# Patient Record
Sex: FEMALE | Race: WHITE | NOT HISPANIC OR LATINO | Employment: OTHER | URBAN - METROPOLITAN AREA
[De-identification: names, ages, dates, MRNs, and addresses within clinical notes are randomized per-mention and may not be internally consistent; named-entity substitution may affect disease eponyms.]

---

## 2015-03-09 LAB — PAP SMEAR - HIM PATIENT REPORTED: NEGATIVE

## 2017-07-06 ASSESSMENT — ENCOUNTER SYMPTOMS
INSOMNIA: 0
ABDOMINAL PAIN: 0
BOWEL INCONTINENCE: 0
CHILLS: 0
CONSTIPATION: 1
NIGHT SWEATS: 1
BLOOD IN STOOL: 0
WEIGHT GAIN: 1
DECREASED LIBIDO: 0
INCREASED ENERGY: 0
DECREASED CONCENTRATION: 0
JAUNDICE: 0
FEVER: 0
POLYDIPSIA: 0
ALTERED TEMPERATURE REGULATION: 1
NERVOUS/ANXIOUS: 0
DEPRESSION: 0
POLYPHAGIA: 0
FATIGUE: 0
HALLUCINATIONS: 0
RECTAL BLEEDING: 0
PANIC: 0
DECREASED APPETITE: 0
HOT FLASHES: 0
HEARTBURN: 0
BLOATING: 1
SWOLLEN GLANDS: 1
NAUSEA: 1
DIARRHEA: 1
POOR WOUND HEALING: 0
RECTAL PAIN: 0
BRUISES/BLEEDS EASILY: 0
WEIGHT LOSS: 0
SKIN CHANGES: 0
NAIL CHANGES: 0
VOMITING: 1

## 2017-07-06 ASSESSMENT — ACTIVITIES OF DAILY LIVING (ADL)
ARE_THERE_SMOKE_DETECTORS_IN_YOUR_HOME?: Y
ARE_THERE_CARBON_MONOXIDE_DETECTORS_IN_YOUR_HOME?: Y
DO_MEMBERS_OF_YOUR_HOUSEHOLD_USE_SAFETY_HELMETS?: Y
DO_MEMBERS_OF_YOUR_HOUSEHOLD_USE_SUNSCREEN?: Y
DO_MEMBERS_OF_YOUR_HOUSEHOLD_WEAR_SEAT_BELTS?: Y
ARE_THERE_FIREARMS_IN_YOUR_HOME?: Y

## 2017-07-07 ENCOUNTER — OFFICE VISIT (OUTPATIENT)
Dept: INTERNAL MEDICINE | Facility: CLINIC | Age: 27
End: 2017-07-07

## 2017-07-07 VITALS
WEIGHT: 130 LBS | SYSTOLIC BLOOD PRESSURE: 114 MMHG | DIASTOLIC BLOOD PRESSURE: 71 MMHG | BODY MASS INDEX: 19.7 KG/M2 | HEIGHT: 68 IN | RESPIRATION RATE: 16 BRPM | HEART RATE: 84 BPM

## 2017-07-07 DIAGNOSIS — R59.1 LYMPHADENOPATHY: ICD-10-CM

## 2017-07-07 DIAGNOSIS — Z00.00 ENCOUNTER FOR ROUTINE ADULT HEALTH EXAMINATION WITHOUT ABNORMAL FINDINGS: ICD-10-CM

## 2017-07-07 DIAGNOSIS — R73.09 ELEVATED GLUCOSE: Primary | ICD-10-CM

## 2017-07-07 DIAGNOSIS — R73.09 ELEVATED GLUCOSE: ICD-10-CM

## 2017-07-07 LAB
ANION GAP SERPL CALCULATED.3IONS-SCNC: 8 MMOL/L (ref 3–14)
BASOPHILS # BLD AUTO: 0 10E9/L (ref 0–0.2)
BASOPHILS NFR BLD AUTO: 0.4 %
BUN SERPL-MCNC: 15 MG/DL (ref 7–30)
CALCIUM SERPL-MCNC: 9.1 MG/DL (ref 8.5–10.1)
CHLORIDE SERPL-SCNC: 103 MMOL/L (ref 94–109)
CO2 SERPL-SCNC: 26 MMOL/L (ref 20–32)
CREAT SERPL-MCNC: 0.8 MG/DL (ref 0.52–1.04)
DIFFERENTIAL METHOD BLD: NORMAL
EOSINOPHIL # BLD AUTO: 0 10E9/L (ref 0–0.7)
EOSINOPHIL NFR BLD AUTO: 0.4 %
ERYTHROCYTE [DISTWIDTH] IN BLOOD BY AUTOMATED COUNT: 12.7 % (ref 10–15)
GFR SERPL CREATININE-BSD FRML MDRD: 85 ML/MIN/1.7M2
GLUCOSE SERPL-MCNC: 79 MG/DL (ref 70–99)
HBA1C MFR BLD: 5.2 % (ref 4.3–6)
HCT VFR BLD AUTO: 38.1 % (ref 35–47)
HGB BLD-MCNC: 13.1 G/DL (ref 11.7–15.7)
IMM GRANULOCYTES # BLD: 0 10E9/L (ref 0–0.4)
IMM GRANULOCYTES NFR BLD: 0.1 %
LYMPHOCYTES # BLD AUTO: 1.9 10E9/L (ref 0.8–5.3)
LYMPHOCYTES NFR BLD AUTO: 28.4 %
MCH RBC QN AUTO: 31.4 PG (ref 26.5–33)
MCHC RBC AUTO-ENTMCNC: 34.4 G/DL (ref 31.5–36.5)
MCV RBC AUTO: 91 FL (ref 78–100)
MONOCYTES # BLD AUTO: 0.4 10E9/L (ref 0–1.3)
MONOCYTES NFR BLD AUTO: 5.3 %
NEUTROPHILS # BLD AUTO: 4.4 10E9/L (ref 1.6–8.3)
NEUTROPHILS NFR BLD AUTO: 65.4 %
NRBC # BLD AUTO: 0 10*3/UL
NRBC BLD AUTO-RTO: 0 /100
PLATELET # BLD AUTO: 197 10E9/L (ref 150–450)
POTASSIUM SERPL-SCNC: 3.7 MMOL/L (ref 3.4–5.3)
RBC # BLD AUTO: 4.17 10E12/L (ref 3.8–5.2)
SODIUM SERPL-SCNC: 137 MMOL/L (ref 133–144)
WBC # BLD AUTO: 6.8 10E9/L (ref 4–11)

## 2017-07-07 RX ORDER — COPPER 313.4 MG/1
1 INTRAUTERINE DEVICE INTRAUTERINE ONCE
COMMUNITY
End: 2019-07-01

## 2017-07-07 ASSESSMENT — ENCOUNTER SYMPTOMS
TACHYCARDIA: 0
BOWEL INCONTINENCE: 0
DOUBLE VISION: 0
TINGLING: 0
NECK MASS: 0
SWOLLEN GLANDS: 1
POOR WOUND HEALING: 0
NECK PAIN: 0
SNORES LOUDLY: 0
SPUTUM PRODUCTION: 0
FEVER: 0
CHILLS: 0
JAUNDICE: 0
MUSCLE CRAMPS: 0
HOARSE VOICE: 0
EXERCISE INTOLERANCE: 0
BLOOD IN STOOL: 0
WEIGHT LOSS: 0
DECREASED APPETITE: 0
DEPRESSION: 0
NAIL CHANGES: 0
DECREASED LIBIDO: 0
WEIGHT GAIN: 1
PALPITATIONS: 0
DIFFICULTY URINATING: 0
SPEECH CHANGE: 0
EYE IRRITATION: 0
BACK PAIN: 0
CONSTIPATION: 1
NERVOUS/ANXIOUS: 0
SINUS PAIN: 0
MUSCLE WEAKNESS: 0
ABDOMINAL PAIN: 0
DIARRHEA: 1
EXTREMITY NUMBNESS: 0
DYSPNEA ON EXERTION: 0
MEMORY LOSS: 0
ORTHOPNEA: 0
RESPIRATORY PAIN: 0
POLYPHAGIA: 0
SMELL DISTURBANCE: 0
WEAKNESS: 0
ALTERED TEMPERATURE REGULATION: 1
HEMATURIA: 0
RECTAL PAIN: 0
TREMORS: 0
EYE REDNESS: 0
DECREASED CONCENTRATION: 0
BLOATING: 1
DIZZINESS: 0
SORE THROAT: 0
PARALYSIS: 0
SYNCOPE: 0
WHEEZING: 0
TROUBLE SWALLOWING: 0
STIFFNESS: 0
JOINT SWELLING: 0
COUGH DISTURBING SLEEP: 0
EYE WATERING: 0
BRUISES/BLEEDS EASILY: 0
MYALGIAS: 0
HALLUCINATIONS: 0
FLANK PAIN: 0
INCREASED ENERGY: 0
VOMITING: 1
HYPOTENSION: 0
SEIZURES: 0
DYSURIA: 0
HEADACHES: 0
INSOMNIA: 0
FATIGUE: 0
NUMBNESS: 0
BREAST PAIN: 0
RECTAL BLEEDING: 0
LEG PAIN: 0
LIGHT-HEADEDNESS: 0
SINUS CONGESTION: 0
ARTHRALGIAS: 0
BREAST MASS: 0
HEMOPTYSIS: 0
PANIC: 0
CLAUDICATION: 0
DISTURBANCES IN COORDINATION: 0
TASTE DISTURBANCE: 0
HEARTBURN: 0
EYE PAIN: 0
HYPERTENSION: 0
POLYDIPSIA: 0
NIGHT SWEATS: 1
SHORTNESS OF BREATH: 0
LOSS OF CONSCIOUSNESS: 0
COUGH: 0
NAUSEA: 1
SLEEP DISTURBANCES DUE TO BREATHING: 0
LEG SWELLING: 0
HOT FLASHES: 0
POSTURAL DYSPNEA: 0
SKIN CHANGES: 0

## 2017-07-07 ASSESSMENT — PAIN SCALES - GENERAL: PAINLEVEL: NO PAIN (0)

## 2017-07-07 NOTE — PATIENT INSTRUCTIONS
Primary Care Center Medication Refill Request Information:  * Please contact your pharmacy regarding ANY request for medication refills.  ** [facility] Prescription Fax = [phone]  * Please allow 3 business days for routine medication refills.  * Please allow 5 business days for controlled substance medication refills.     Primary Care Center Test Result notification information:  *You will be notified within 7-10 days of your appointment day regarding the results of your test.  If you are on MyChart you will be notified as soon as the provider has reviewed the results and signed off on them.    Clinic Information for New Patients    MyChart:  I encourage all my patients to sign up for MyChart.  You will get the majority of your results within 48 hours via Apperian.     You can also contact me with quick questions/concerns using Apperian.  I will do my best to answer your questions within a few business days; however, for more urgent questions please call the clinic.  If I am unable to answer your question via [email or web address] I will ask you to schedule an appointment.    Urgent Appointments:  During weeks when I have limited clinic availability, I reserve some appointments for my established patients.  These appointments are not available to schedule through the main scheduling number.  If you need an appointment and there are none available in the time frame requested, please talk with Olga about getting one of these reserved appointments.      I can not guarantee that I will be able to see you within the time frame requested; however, we will do our best to make sure your needs are met.    Nurse  Olga Chapman RN  Phone: [phone], option #3  You will be asked to leave a message and she will get back to you as soon as possible.

## 2017-07-07 NOTE — MR AVS SNAPSHOT
After Visit Summary   7/7/2017    Vanessa Stephenson    MRN: [id number]           Patient Information     Date Of Birth          1990        Visit Information        Provider Department      7/7/2017 12:30 PM Jaimee Fox MD [facility]        Today's Diagnoses     Elevated glucose    -  1    Lymphadenopathy          Care Instructions    Primary Care Center Medication Refill Request Information:  * Please contact your pharmacy regarding ANY request for medication refills.  ** [facility] Prescription Fax = [phone]  * Please allow 3 business days for routine medication refills.  * Please allow 5 business days for controlled substance medication refills.     Primary Care Center Test Result notification information:  *You will be notified within 7-10 days of your appointment day regarding the results of your test.  If you are on MyChart you will be notified as soon as the provider has reviewed the results and signed off on them.    Clinic Information for New Patients    MyChart:  I encourage all my patients to sign up for [employer].  You will get the majority of your results within 48 hours via Sportomato.     You can also contact me with quick questions/concerns using Sportomato.  I will do my best to answer your questions within a few business days; however, for more urgent questions please call the clinic.  If I am unable to answer your question via Stockdriftt I will ask you to schedule an appointment.    Urgent Appointments:  During weeks when I have limited clinic availability, I reserve some appointments for my established patients.  These appointments are not available to schedule through the main scheduling number.  If you need an appointment and there are none available in the time frame requested, please talk with Olga about getting one of these reserved appointments.      I can not guarantee that I will be able to see you within the time frame requested; however, we will do our  best to make sure your needs are met.    Nurse  Olga Chapman RN  Phone: [phone], option #3  You will be asked to leave a message and she will get back to you as soon as possible.            Follow-ups after your visit        Your next 10 appointments already scheduled     Jul 07, 2017  1:30 PM CDT   LAB with  LAB    Health Lab ([facility])    [address] [address] [phone]           Patient must bring picture ID.  Patient should be prepared to give a urine specimen  Please do not eat 10-12 hours before your appointment if you are coming in fasting for labs on lipids, cholesterol, or glucose (sugar).  Pregnant women should follow their Care Team instructions. Water with medications is okay. Do not drink coffee or other fluids.   If you have concerns about taking  your medications, please ask at office or if scheduling via [facility], send a message by clicking on Secure Messaging, Message Your Care Team.              Future tests that were ordered for you today     Open Future Orders        Priority Expected Expires Ordered    Hemoglobin A1c Routine 7/7/2017 7/21/2017 7/7/2017    CBC with platelets differential Routine 7/7/2017 7/21/2017 7/7/2017    Basic metabolic panel Routine 7/7/2017 7/21/2017 7/7/2017            Who to contact     Please call your clinic at [phone] to:    Ask questions about your health    Make or cancel appointments    Discuss your medicines    Learn about your test results    Speak to your doctor   If you have compliments or concerns about an experience at your clinic, or if you wish to file a complaint, please contact [facility] Patient Relations at [phone] or email us at [email or web address]         Additional Information About Your Visit        [employer] Information     [facility] gives you secure access to your electronic health record. If you see a primary care provider, you  "can also send messages to your care team and make appointments. If you have questions, please call your primary care clinic.  If you do not have a primary care provider, please call [phone] and they will assist you.      [employer] is an electronic gateway that provides easy, online access to your medical records. With [employer], you can request a clinic appointment, read your test results, renew a prescription or communicate with your care team.     To access your existing account, please contact your [facility] or call [phone] for assistance.        Care EveryWhere ID     This is your Care EveryWhere ID. This could be used by other organizations to access your [place] medical records  [id number]        Your Vitals Were     Pulse Respirations Height Last Period BMI (Body Mass Index)       84 16 1.721 m (5' 7.76\") 06/26/2017 19.91 kg/m2        Blood Pressure from Last 3 Encounters:   07/07/17 114/71    Weight from Last 3 Encounters:   07/07/17 59 kg (130 lb)               Primary Care Provider Office Phone # Fax #    Jaimee Charu Fox [phone]       [facility] [address]  [facility] [address]        Equal Access to Services     RADHA LOPEZ AH: Hadii aad ku hadasho Soomaali, waaxda luqadaha, qaybta kaalmada adeegyada, kindra idiin haydorothyn germania henderson. So [facility] [phone].    ATENCIÓN: Si habla español, tiene a ochoa disposición servicios gratuitos de asistencia lingüística. Llame al [phone].    We comply with applicable federal civil rights laws and [place] laws. We do not discriminate on the basis of race, color, national origin, age, disability sex, sexual orientation or gender identity.            Thank you!     Thank you for choosing [facility]  for your care. Our goal is always to provide you with excellent care. Hearing back from our patients is one way we can continue to improve our services. " Please take a few minutes to complete the written survey that you may receive in the mail after your visit with us. Thank you!             Your Updated Medication List - Protect others around you: Learn how to safely use, store and throw away your medicines at www.disposemymeds.org.          This list is accurate as of: 7/7/17  1:16 PM.  Always use your most recent med list.                   Brand Name Dispense Instructions for use Diagnosis    paragard intrauterine copper      1 each by Intrauterine route once

## 2017-07-07 NOTE — NURSING NOTE
Chief Complaint   Patient presents with     Establish Care     Pt is here to establish care.      Cassie Lal LPN July 7, 2017 12:40 PM

## 2017-07-07 NOTE — PROGRESS NOTES
"CC: Annual physical exam    HPI:    Vanessa Stephenson is here for a routine physical.  She is overall in good health, but is concerned about a LN she has noticed recently.    LN:  Cervical LNs, R axillary LN.  No groin that she's appreciated.    Was down in [facility] and ate fast food.  Digestion got \"off\".  LAD started beginning of June.      No fevers, chills.  Has been temperature sensitive.  +night sweats for years.  On and off.  No clear trigger.  Weight has been stable.    Elevated glucose:  Has a glucometer that she periodicially checks fasting glucoses.  Notes that even when fasting she can have a glucose up to 110    Gyne:  Has followed with gynecology.  Has copper IUD x5 years that she is happy with.  In monogamous relationship with her .  Last pap normal.    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    Tobacco screen:  History   Smoking Status     Never Smoker   Smokeless Tobacco     Never Used     Obesity screen:  Body mass index is 19.91 kg/(m^2).  Exercises regularly    Review of Systems     Constitutional:  Positive for weight gain and night sweats. Negative for fever, chills, weight loss, fatigue, decreased appetite, recent stressors, height loss, post-operative complications, incisional pain, hallucinations, increased energy, hyperactivity and confused.   HENT:  Negative for ear pain, hearing loss, tinnitus, nosebleeds, trouble swallowing, hoarse voice, mouth sores, sore throat, ear discharge, tooth pain, gum tenderness, taste disturbance, smell disturbance, hearing aid, bleeding gums, dry mouth, sinus pain, sinus congestion and neck mass.    Eyes:  Negative for double vision, pain, redness, eye pain, decreased vision, eye watering, eye bulging, eye dryness, flashing lights, spots, floaters, strabismus, tunnel vision, jaundice and eye irritation.   Respiratory:   Negative for cough, hemoptysis, sputum production, shortness of breath, " wheezing, sleep disturbances due to breathing, snores loudly, respiratory pain, dyspnea on exertion, cough disturbing sleep and postural dyspnea.    Cardiovascular:  Negative for chest pain, dyspnea on exertion, palpitations, orthopnea, claudication, leg swelling, fingers/toes turn blue, hypertension, hypotension, syncope, history of heart murmur, chest pain on exertion, chest pain at rest, pacemaker, few scattered varicosities, leg pain, sleep disturbances due to breathing, tachycardia, light-headedness, exercise intolerance and edema.   Gastrointestinal:  Positive for nausea, vomiting, diarrhea, constipation, bloating and change in stool. Negative for heartburn, abdominal pain, blood in stool, melena, rectal pain, hemorrhoids, bowel incontinence, jaundice, rectal bleeding and coffee ground emesis.   Genitourinary:  Positive for abnormal vaginal bleeding. Negative for bladder incontinence, dysuria, urgency, hematuria, flank pain, vaginal discharge, difficulty urinating, genital sores, dyspareunia, decreased libido, nocturia, voiding less frequently, arousal difficulty, excessive menstruation, menstrual changes, hot flashes, vaginal dryness and postmenopausal bleeding.   Musculoskeletal:  Negative for myalgias, back pain, joint swelling, arthralgias, stiffness, muscle cramps, neck pain, bone pain, muscle weakness and fracture.   Skin:  Positive for rash, acne and warts. Negative for nail changes, itching, poor wound healing, hair changes, skin changes, poor wound healing, scarring, flaky skin, Raynaud's phenomenon, sensitivity to sunlight and skin thickening.   Neurological:  Negative for dizziness, tingling, tremors, speech change, seizures, loss of consciousness, weakness, light-headedness, numbness, headaches, disturbances in coordination, extremity numbness, memory loss, difficulty walking and paralysis.   Endo/Heme:  Positive for swollen glands. Negative for anemia and bruises/bleeds easily.  "  Psychiatric/Behavioral:  Positive for mood swings. Negative for depression, hallucinations, memory loss, decreased concentration and panic attacks.    Breast:  Negative for breast discharge, breast mass, breast pain and nipple retraction.   Endocrine:  Positive for altered temperature regulation and unwanted hair growth.Negative for polyphagia, polydipsia and change in facial hair.      Medications, allergies, family history, and social history were reviewed and updated in the chart    Past medical history was reviewed and updated  There is no problem list on file for this patient.      OBJECTIVE:  Physical Exam:  /71  Pulse 84  Resp 16  Ht 1.721 m (5' 7.76\")  Wt 59 kg (130 lb)  LMP 06/26/2017  BMI 19.91 kg/m2    GENERAL APPEARANCE: healthy, alert and no distress     EYES: EOMI     HENT: ear canals and TM's normal and nose and mouth without ulcers or lesions     NECK: no adenopathy, no asymmetry, masses, or scars and thyroid normal to palpation     RESP: lungs clear to auscultation - no rales, rhonchi or wheezes     CV: regular rates and rhythm, normal S1 S2, no S3 or S4 and no murmur, click or rub -     ABDOMEN:  soft, nontender, no HSM or masses and bowel sounds normal     MS: extremities normal- no gross deformities noted,      SKIN: no suspicious lesions or rashes     PSYCH: mentation appears normal. and affect normal/bright     LYMPHATICS: ?small, mobile <1/2in LN under R axilla.  No cervical, inguinal, or supraclavicular nodes      ASSESSMENT/PLAN:  # Preventive Care Visit:     Elevated glucose  - Hemoglobin A1c  - Basic metabolic panel    Lymphadenopathy  She will monitor clinically.  If no resolution over next few weeks will get US  - CBC with platelets differential    RTC: prn      Jaimee Fox MD    "

## 2018-05-15 ENCOUNTER — HEALTH MAINTENANCE LETTER (OUTPATIENT)
Age: 28
End: 2018-05-15

## 2019-06-18 ENCOUNTER — ANCILLARY PROCEDURE (OUTPATIENT)
Dept: ULTRASOUND IMAGING | Facility: CLINIC | Age: 29
End: 2019-06-18
Attending: MIDWIFE
Payer: COMMERCIAL

## 2019-06-18 ENCOUNTER — OFFICE VISIT (OUTPATIENT)
Dept: OBGYN | Facility: CLINIC | Age: 29
End: 2019-06-18
Attending: MIDWIFE
Payer: COMMERCIAL

## 2019-06-18 VITALS
DIASTOLIC BLOOD PRESSURE: 70 MMHG | HEIGHT: 67 IN | HEART RATE: 68 BPM | WEIGHT: 134.6 LBS | SYSTOLIC BLOOD PRESSURE: 119 MMHG | BODY MASS INDEX: 21.12 KG/M2

## 2019-06-18 DIAGNOSIS — Z34.91 ENCOUNTER FOR SUPERVISION OF NORMAL PREGNANCY IN FIRST TRIMESTER, UNSPECIFIED GRAVIDITY: ICD-10-CM

## 2019-06-18 DIAGNOSIS — Z34.01 ENCOUNTER FOR SUPERVISION OF NORMAL FIRST PREGNANCY IN FIRST TRIMESTER: Primary | ICD-10-CM

## 2019-06-18 LAB
ABO + RH BLD: NORMAL
ABO + RH BLD: NORMAL
BASOPHILS # BLD AUTO: 0 10E9/L (ref 0–0.2)
BASOPHILS NFR BLD AUTO: 0.2 %
BLD GP AB SCN SERPL QL: NORMAL
BLOOD BANK CMNT PATIENT-IMP: NORMAL
DIFFERENTIAL METHOD BLD: NORMAL
EOSINOPHIL # BLD AUTO: 0.1 10E9/L (ref 0–0.7)
EOSINOPHIL NFR BLD AUTO: 0.7 %
ERYTHROCYTE [DISTWIDTH] IN BLOOD BY AUTOMATED COUNT: 12.8 % (ref 10–15)
HCT VFR BLD AUTO: 37 % (ref 35–47)
HGB BLD-MCNC: 12.6 G/DL (ref 11.7–15.7)
IMM GRANULOCYTES # BLD: 0 10E9/L (ref 0–0.4)
IMM GRANULOCYTES NFR BLD: 0.2 %
LYMPHOCYTES # BLD AUTO: 2.3 10E9/L (ref 0.8–5.3)
LYMPHOCYTES NFR BLD AUTO: 21.9 %
MCH RBC QN AUTO: 31 PG (ref 26.5–33)
MCHC RBC AUTO-ENTMCNC: 34.1 G/DL (ref 31.5–36.5)
MCV RBC AUTO: 91 FL (ref 78–100)
MONOCYTES # BLD AUTO: 0.6 10E9/L (ref 0–1.3)
MONOCYTES NFR BLD AUTO: 5.4 %
NEUTROPHILS # BLD AUTO: 7.4 10E9/L (ref 1.6–8.3)
NEUTROPHILS NFR BLD AUTO: 71.6 %
NRBC # BLD AUTO: 0 10*3/UL
NRBC BLD AUTO-RTO: 0 /100
PLATELET # BLD AUTO: 189 10E9/L (ref 150–450)
RBC # BLD AUTO: 4.06 10E12/L (ref 3.8–5.2)
SPECIMEN EXP DATE BLD: NORMAL
WBC # BLD AUTO: 10.3 10E9/L (ref 4–11)

## 2019-06-18 PROCEDURE — 36415 COLL VENOUS BLD VENIPUNCTURE: CPT | Performed by: ADVANCED PRACTICE MIDWIFE

## 2019-06-18 PROCEDURE — 87086 URINE CULTURE/COLONY COUNT: CPT | Performed by: ADVANCED PRACTICE MIDWIFE

## 2019-06-18 PROCEDURE — 86706 HEP B SURFACE ANTIBODY: CPT | Performed by: ADVANCED PRACTICE MIDWIFE

## 2019-06-18 PROCEDURE — 86900 BLOOD TYPING SEROLOGIC ABO: CPT | Performed by: ADVANCED PRACTICE MIDWIFE

## 2019-06-18 PROCEDURE — 86803 HEPATITIS C AB TEST: CPT | Performed by: ADVANCED PRACTICE MIDWIFE

## 2019-06-18 PROCEDURE — 86762 RUBELLA ANTIBODY: CPT | Performed by: ADVANCED PRACTICE MIDWIFE

## 2019-06-18 PROCEDURE — 76801 OB US < 14 WKS SINGLE FETUS: CPT

## 2019-06-18 PROCEDURE — 86780 TREPONEMA PALLIDUM: CPT | Performed by: ADVANCED PRACTICE MIDWIFE

## 2019-06-18 PROCEDURE — 86901 BLOOD TYPING SEROLOGIC RH(D): CPT | Performed by: ADVANCED PRACTICE MIDWIFE

## 2019-06-18 PROCEDURE — 87389 HIV-1 AG W/HIV-1&-2 AB AG IA: CPT | Performed by: ADVANCED PRACTICE MIDWIFE

## 2019-06-18 PROCEDURE — G0463 HOSPITAL OUTPT CLINIC VISIT: HCPCS

## 2019-06-18 PROCEDURE — 82306 VITAMIN D 25 HYDROXY: CPT | Performed by: ADVANCED PRACTICE MIDWIFE

## 2019-06-18 PROCEDURE — 85025 COMPLETE CBC W/AUTO DIFF WBC: CPT | Performed by: ADVANCED PRACTICE MIDWIFE

## 2019-06-18 PROCEDURE — 86850 RBC ANTIBODY SCREEN: CPT | Performed by: ADVANCED PRACTICE MIDWIFE

## 2019-06-18 PROCEDURE — 87340 HEPATITIS B SURFACE AG IA: CPT | Performed by: ADVANCED PRACTICE MIDWIFE

## 2019-06-18 RX ORDER — CHLORAL HYDRATE 500 MG
2 CAPSULE ORAL DAILY
COMMUNITY

## 2019-06-18 ASSESSMENT — MIFFLIN-ST. JEOR: SCORE: 1363.17

## 2019-06-18 NOTE — PROGRESS NOTES
[facility] OB Intake note  Subjective   29 year old woman presents to clinic for initiation of OB care. Patient's last menstrual period was 04/16/2019. No obstetric history on file.  Date of Delivery: Jan 21, 2020 based on LMP, US confirms. Reviewed dating ultrasound. Pregnancy is planned.      Symptoms since LMP include nausea and fatigue. Patient has tried these relief measures: small frequent meals, increased rest and increased fluids.    - Genetic/Infection questionnaire completed, risks include. Pt  does not have a recent known exposure to Parvo or CMV so IgG/IgM testing WILL NOT be ordered.   Have you traveled during the pregnancy? [place].     Have your sexual partner(s) travelled during the pregnancy? Yes, If yes, where? if yes, who?   Patient and  planning trip to [facility] in October for a wedding, will check with [facility] for Zika risk.    - Current Medications    Current Outpatient Medications   Medication Sig Dispense Refill     fish oil-omega-3 fatty acids 1000 MG capsule Take 2 g by mouth daily       Prenatal MV-Min-Fe Fum-FA-DHA (PRENATAL 1 PO)        IRON PO        paragard intrauterine copper 1 each by Intrauterine route once       - Co-morbids:  - Risk for GDM -  does not  have Personal history of GDM, BMI>30, h/o prediabetes/glucose intolerance, first degree relative with GDM or DM    WILL NOT have an early GCT and possible Hgb A1C    - High Risk for Pre E- meets one of following criteria The patient does not h/o Pre Eclampsia, Current multi fetal gestation, Pre Gestational Diabetes (Type 1 or Type 2), chronic hypertension, renal disease, Autoimmune disease (systematic lupus erythematosus, antiphospholipid syndrome) so WILL NOT start low dose aspirin (81mg) starting between 12 and 28 weeks to prevent early onset preeclampsia.    - Moderate risk - meets more than one of several moderate risk facrtors for Pre E - Nulliparity, Obesity (body mass index >30 kg/m2),Family history of preeclampsia (mother or  sister), Sociodemographic characteristics ( race, low socioeconomic status), Age ?35 years, Personal history factors (e.g., low birthweight or small for gestational age, previous adverse pregnancy outcome, >10-year pregnancy interval)  so WILL NOT consider starting low dose aspirin (81mg) starting between 12 and 28 weeks to prevent early onset preeclampsia.    - The patient  does not have a history of spontaneous  birth so  WILL NOT consider progesterone starting at 16-20 weeks and/or serial transvaginal cervical length ultrasounds from 16-24 weeks.     -The patient does not have a history of immunosuppresion or HIV so Toxoplasma IgG/IgM WILL NOT be ordered.     PERSONAL/SOCIAL HISTORY   lives with her spouse, Levi who presents with her today.  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 Levi works as a water salesman.  History of anxiety or depression:  Some anxiety, uses MBSR, no meds  Additional items: Denies past or present domestic violence, sexual and psychological abuse.    Objective  -VS: reviewed and within normal limits   -General appearance: no acute distress, patient is comfortable   NEUROLOGICAL/PSYCHIATRIC   - Orientated x3,   -Mood and affect: : normal     Assessment/Plan  Vanessa was seen today for prenatal care.    Diagnoses and all orders for this visit:    Encounter for supervision of normal first pregnancy in first trimester  -     25- OH-Vitamin D  -     ABO/Rh Type and Screen  -     CBC with Platelets Differential  -     Hepatitis B Surface Antigen  -     HIV Antigen Antibody Combo  -     Rubella Antibody IgG Quantitative  -     Treponema Abs w Reflex to RPR and Titer  -     Urine Culture Aerobic Bacterial  -     Hepatitis C antibody  -     Hepatitis B Surface Antibody    29 year old prime. Unknown weeks of pregnancy with WILVER of 2020 by LMP of Patient's last menstrual period was 2019..  Ultrasound confirms.   Outpatient Encounter Medications as of 6/18/2019   Medication Sig Dispense Refill     fish oil-omega-3 fatty acids 1000 MG capsule Take 2 g by mouth daily       Prenatal MV-Min-Fe Fum-FA-DHA (PRENATAL 1 PO)        IRON PO        paragard intrauterine copper 1 each by Intrauterine route once       No facility-administered encounter medications on file as of 6/18/2019.       Orders Placed This Encounter   Procedures     25- OH-Vitamin D     CBC with Platelets Differential     Hepatitis B Surface Antigen     HIV Antigen Antibody Combo     Rubella Antibody IgG Quantitative     Treponema Abs w Reflex to RPR and Titer     Hepatitis C antibody     Hepatitis B Surface Antibody     ABO/Rh Type and Screen     - Oriented to Practice, types of care, and how to reach a provider.  Pt prefers CNM.  - Patient received 1st trimester new OB education packet complete with aide of [employer] [employer] booklet including information on genetic screening test options.  - Patient would like to review info provided and discuss options further at new OB visit.  - Educational handout on the prevention of infections diseases during pregnancy provided.  - Patient was encouraged to start prenatal vitamins as tolerated.    - Patient was sent to lab for routine OB labs with Hepatitis C screening.   - Pregnancy concerns to be addressed by provider at new OB exam include: GC/CT and genetic screening.    Pt to RTO for NOB visit in 2 weeks and prn if questions or concerns    Sera Landry CNM

## 2019-06-18 NOTE — LETTER
6/18/2019       RE: Vanessa Stephenson  [address] [address]     Dear Colleague,    Thank you for referring your patient, Vanessa Stephenson, to the [facility] at [facility]. Please see a copy of my visit note below.    [facility] OB Intake note  Subjective   29 year old woman presents to clinic for initiation of OB care. Patient's last menstrual period was 04/16/2019. No obstetric history on file.  Date of Delivery: Jan 21, 2020 based on LMP, US confirms. Reviewed dating ultrasound. Pregnancy is planned.      Symptoms since LMP include nausea and fatigue. Patient has tried these relief measures: small frequent meals, increased rest and increased fluids.    - Genetic/Infection questionnaire completed, risks include. Pt  does not have a recent known exposure to Parvo or CMV so IgG/IgM testing WILL NOT be ordered.   Have you traveled during the pregnancy? [place].     Have your sexual partner(s) travelled during the pregnancy? Yes, If yes, where? if yes, who?   Patient and  planning trip to [facility] [place] in October for a wedding, will check with [facility] for Zika risk.    - Current Medications    Current Outpatient Medications   Medication Sig Dispense Refill     fish oil-omega-3 fatty acids 1000 MG capsule Take 2 g by mouth daily       Prenatal MV-Min-Fe Fum-FA-DHA (PRENATAL 1 PO)        IRON PO        paragard intrauterine copper 1 each by Intrauterine route once       - Co-morbids:  - Risk for GDM -  does not  have Personal history of GDM, BMI>30, h/o prediabetes/glucose intolerance, first degree relative with GDM or DM    WILL NOT have an early GCT and possible Hgb A1C    - High Risk for Pre E- meets one of following criteria The patient does not h/o Pre Eclampsia, Current multi fetal gestation, Pre Gestational Diabetes (Type 1 or Type 2), chronic hypertension, renal disease, Autoimmune disease (systematic lupus erythematosus, antiphospholipid  syndrome) so WILL NOT start low dose aspirin (81mg) starting between 12 and 28 weeks to prevent early onset preeclampsia.    - Moderate risk - meets more than one of several moderate risk facrtors for Pre E - Nulliparity, Obesity (body mass index >30 kg/m2),Family history of preeclampsia (mother or sister), Sociodemographic characteristics ( race, low socioeconomic status), Age ?35 years, Personal history factors (e.g., low birthweight or small for gestational age, previous adverse pregnancy outcome, >10-year pregnancy interval)  so WILL NOT consider starting low dose aspirin (81mg) starting between 12 and 28 weeks to prevent early onset preeclampsia.    - The patient  does not have a history of spontaneous  birth so  WILL NOT consider progesterone starting at 16-20 weeks and/or serial transvaginal cervical length ultrasounds from 16-24 weeks.     -The patient does not have a history of immunosuppresion or HIV so Toxoplasma IgG/IgM WILL NOT be ordered.     PERSONAL/SOCIAL HISTORY   lives with her spouse, Levi who presents with her today.  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 Levi works as a water salesman.  History of anxiety or depression:  Some anxiety, uses MBSR, no meds  Additional items: Denies past or present domestic violence, sexual and psychological abuse.    Objective  -VS: reviewed and within normal limits   -General appearance: no acute distress, patient is comfortable   NEUROLOGICAL/PSYCHIATRIC   - Orientated x3,   -Mood and affect: : normal     Assessment/Plan  Vanessa was seen today for prenatal care.    Diagnoses and all orders for this visit:    Encounter for supervision of normal first pregnancy in first trimester  -     25- OH-Vitamin D  -     ABO/Rh Type and Screen  -     CBC with Platelets Differential  -     Hepatitis B Surface Antigen  -     HIV Antigen Antibody Combo  -     Rubella Antibody  IgG Quantitative  -     Treponema Abs w Reflex to RPR and Titer  -     Urine Culture Aerobic Bacterial  -     Hepatitis C antibody  -     Hepatitis B Surface Antibody    29 year old prime. Unknown weeks of pregnancy with WILVER of Jan 21, 2020 by LMP of Patient's last menstrual period was 04/16/2019.. Ultrasound confirms.   Outpatient Encounter Medications as of 6/18/2019   Medication Sig Dispense Refill     fish oil-omega-3 fatty acids 1000 MG capsule Take 2 g by mouth daily       Prenatal MV-Min-Fe Fum-FA-DHA (PRENATAL 1 PO)        IRON PO        paragard intrauterine copper 1 each by Intrauterine route once       No facility-administered encounter medications on file as of 6/18/2019.       Orders Placed This Encounter   Procedures     25- OH-Vitamin D     CBC with Platelets Differential     Hepatitis B Surface Antigen     HIV Antigen Antibody Combo     Rubella Antibody IgG Quantitative     Treponema Abs w Reflex to RPR and Titer     Hepatitis C antibody     Hepatitis B Surface Antibody     ABO/Rh Type and Screen     - Oriented to Practice, types of care, and how to reach a provider.  Pt prefers CNM.  - Patient received 1st trimester new OB education packet complete with aide of [facility] booklet including information on genetic screening test options.  - Patient would like to review info provided and discuss options further at new OB visit.  - Educational handout on the prevention of infections diseases during pregnancy provided.  - Patient was encouraged to start prenatal vitamins as tolerated.    - Patient was sent to lab for routine OB labs with Hepatitis C screening.   - Pregnancy concerns to be addressed by provider at new OB exam include: GC/CT and genetic screening.    Pt to RTO for NOB visit in 2 weeks and prn if questions or concerns    Sera Landry CNM

## 2019-06-19 LAB
BACTERIA SPEC CULT: NORMAL
DEPRECATED CALCIDIOL+CALCIFEROL SERPL-MC: 34 UG/L (ref 20–75)
HBV SURFACE AB SERPL IA-ACNC: 119.22 M[IU]/ML
HBV SURFACE AG SERPL QL IA: NONREACTIVE
HCV AB SERPL QL IA: NONREACTIVE
HIV 1+2 AB+HIV1 P24 AG SERPL QL IA: NONREACTIVE
Lab: NORMAL
RUBV IGG SERPL IA-ACNC: 15 IU/ML
SPECIMEN SOURCE: NORMAL
T PALLIDUM AB SER QL: NONREACTIVE

## 2019-07-01 ENCOUNTER — OFFICE VISIT (OUTPATIENT)
Dept: OBGYN | Facility: CLINIC | Age: 29
End: 2019-07-01
Attending: ADVANCED PRACTICE MIDWIFE
Payer: COMMERCIAL

## 2019-07-01 VITALS
HEART RATE: 68 BPM | SYSTOLIC BLOOD PRESSURE: 112 MMHG | WEIGHT: 137.6 LBS | DIASTOLIC BLOOD PRESSURE: 66 MMHG | HEIGHT: 67 IN | BODY MASS INDEX: 21.6 KG/M2

## 2019-07-01 DIAGNOSIS — Z36.89 ENCOUNTER FOR FETAL ANATOMIC SURVEY: ICD-10-CM

## 2019-07-01 DIAGNOSIS — Z34.01 NORMAL FIRST PREGNANCY CONFIRMED, CURRENTLY IN FIRST TRIMESTER: ICD-10-CM

## 2019-07-01 DIAGNOSIS — Z13.79 GENETIC SCREENING: ICD-10-CM

## 2019-07-01 DIAGNOSIS — Z34.00 NORMAL FIRST PREGNANCY CONFIRMED, ANTEPARTUM: ICD-10-CM

## 2019-07-01 DIAGNOSIS — O26.899 RH NEGATIVE STATE IN ANTEPARTUM PERIOD: ICD-10-CM

## 2019-07-01 DIAGNOSIS — Z67.91 RH NEGATIVE STATE IN ANTEPARTUM PERIOD: ICD-10-CM

## 2019-07-01 DIAGNOSIS — Z34.01 NORMAL FIRST PREGNANCY IN FIRST TRIMESTER: Primary | ICD-10-CM

## 2019-07-01 PROCEDURE — G0463 HOSPITAL OUTPT CLINIC VISIT: HCPCS | Mod: ZF

## 2019-07-01 PROCEDURE — 87491 CHLMYD TRACH DNA AMP PROBE: CPT | Performed by: ADVANCED PRACTICE MIDWIFE

## 2019-07-01 PROCEDURE — 87591 N.GONORRHOEAE DNA AMP PROB: CPT | Performed by: ADVANCED PRACTICE MIDWIFE

## 2019-07-01 ASSESSMENT — ANXIETY QUESTIONNAIRES
3. WORRYING TOO MUCH ABOUT DIFFERENT THINGS: NOT AT ALL
2. NOT BEING ABLE TO STOP OR CONTROL WORRYING: NOT AT ALL
GAD7 TOTAL SCORE: 0
5. BEING SO RESTLESS THAT IT IS HARD TO SIT STILL: NOT AT ALL
6. BECOMING EASILY ANNOYED OR IRRITABLE: NOT AT ALL
1. FEELING NERVOUS, ANXIOUS, OR ON EDGE: NOT AT ALL
7. FEELING AFRAID AS IF SOMETHING AWFUL MIGHT HAPPEN: NOT AT ALL

## 2019-07-01 ASSESSMENT — PATIENT HEALTH QUESTIONNAIRE - PHQ9: 5. POOR APPETITE OR OVEREATING: NOT AT ALL

## 2019-07-01 ASSESSMENT — MIFFLIN-ST. JEOR: SCORE: 1376.78

## 2019-07-01 NOTE — LETTER
2019    RE: Vanessa Stephenson  [address] [address]     Dear Colleague,    Thank you for referring your patient, Vanessa Stephenson, to the [facility] at [facility]. Please see a copy of my visit note below.    SUBJECTIVE:   Vanessa is a 29 year old female who presents to clinic for a new OB visit.   at 10w6d with Estimated Date of Delivery: 2020 based on US confirms. Feels well. Has started PNV.     She has not had bleeding since her LMP.   She has not had nausea. Weight loss has not occurred.   This was a planned pregnancy.   FOB is involved,      OTHER CONCERNS: none    ===========================================   ROS: 10 point ROS neg other than the symptoms noted above in the HPI.      PSYCHIATRIC:  Denies mood changes  PHQ-9 score:  No flowsheet data found.  CATRACHO-7 SCORE 2019   Total Score 0         Past History:  Her past medical history No past medical history on file..   This is her first pregnancy  Since her last LMP she denies use of alcohol, tobacco and street drugs.  HISTORY:  Family History   Problem Relation Age of Onset     Arthritis Mother         rheumatoid arthritis     Anxiety Disorder Mother      Depression Mother      Hypertension Father      Hyperlipidemia Father      Cancer Maternal Grandmother         breast cancer, 60's     Breast Cancer Maternal Grandmother      Substance Abuse Maternal Grandmother      Depression Maternal Grandmother      Cancer Paternal Grandmother         lung cancer     Osteoporosis Paternal Grandmother      Substance Abuse Paternal Grandfather      Asthma Sister         Exercise induced     Social History     Socioeconomic History     Marital status:      Spouse name: Levi     Number of children: Not on file     Years of education: Not on file     Highest education level: Not on file   Occupational History     Occupation: chiropractor     Employer: SEL[employer]   Social  Needs     Financial resource strain: Not on file     Food insecurity:     Worry: Not on file     Inability: Not on file     Transportation needs:     Medical: Not on file     Non-medical: Not on file   Tobacco Use     Smoking status: Never Smoker     Smokeless tobacco: Never Used   Substance and Sexual Activity     Alcohol use: Not Currently     Comment: occasional     Drug use: No     Sexual activity: Yes     Partners: Male     Birth control/protection: None   Lifestyle     Physical activity:     Days per week: Not on file     Minutes per session: Not on file     Stress: Not on file   Relationships     Social connections:     Talks on phone: Not on file     Gets together: Not on file     Attends Jain service: Not on file     Active member of club or organization: Not on file     Attends meetings of clubs or organizations: Not on file     Relationship status: Not on file     Intimate partner violence:     Fear of current or ex partner: Not on file     Emotionally abused: Not on file     Physically abused: Not on file     Forced sexual activity: Not on file   Other Topics Concern     Not on file   Social History Narrative     Not on file     Current Outpatient Medications   Medication Sig     fish oil-omega-3 fatty acids 1000 MG capsule Take 2 g by mouth daily     IRON PO      Prenatal MV-Min-Fe Fum-FA-DHA (PRENATAL 1 PO)      No current facility-administered medications for this visit.      No Known Allergies    ============================================  MEDICAL HISTORY  No past medical history on file.  Past Surgical History:   Procedure Laterality Date     wisdom teeth         OB History    Para Term  AB Living   1 0 0 0 0 0   SAB TAB Ectopic Multiple Live Births   0 0 0 0 0      # Outcome Date GA Lbr Sridhar/2nd Weight Sex Delivery Anes PTL Lv   1 Current                  GYN History- No Abnormal Pap Smears Last pap -patient desires to delay pap until postpartum                         "Cervical procedures: none                        History of STI: None    I personally reviewed the past social/family/medical and surgical history on the date of service.   I reviewed lab work done at Intake visit with patient.    EXAM:  /66 (BP Location: Left arm, Patient Position: Chair)   Pulse 68   Ht 1.702 m (5' 7\")   Wt 62.4 kg (137 lb 9.6 oz)   LMP 2019   BMI 21.55 kg/m      EXAM:  GENERAL:  Pleasant pregnant female, alert, cooperative  and well groomed.  SKIN:  Warm and dry, without lesions or rashes  HEAD: Symmetrical features.  MOUTH:  Buccal mucosa pink, moist without lesions.  Teeth in good repair.    NECK:  Thyroid without enlargement and nodules.  Lymph nodes not palpable.   LUNGS:  Clear to auscultation.  BREAST:    No dominant, fixed or suspicious masses are noted.  No skin or nipple changes or axillary nodes.   Nipples flat.      HEART:  RRR without murmur.  ABDOMEN: Soft without masses , tenderness or organomegaly.  No CVA tenderness.  Uterus not palpable at size equal to dates.  No scars noted.. Fetal heart tones present.  MUSCULOSKELETAL:  Full range of motion  EXTREMITIES:  No edema. No significant varicosities.   PELVIC EXAM:  Deferred at patient request  WET PREP:Not done  GC/CHLAMYDIA CULTURE OBTAINED:YES    No results found for: PAP       ASSESSMENT:  29 year old , 10w6d weeks of pregnancy with WILVER of 2020 by LMP  Intrauterine pregnancy 10w6d size consistent with dates  Genetic Screening: First Trimester Screen    ICD-10-CM    1. Normal first pregnancy in first trimester Z34.01 Chlamydia trachomatis PCR (Clinic Collect)     Neisseria gonorrhoeae PCR   2. Normal first pregnancy confirmed, antepartum Z34.00    3. Normal first pregnancy confirmed, currently in first trimester Z34.01    4. Genetic screening Z13.79 [facility]   5. Encounter for fetal anatomic survey Z36.89 [facility] REFERRAL-[facility]NC[facility]   6. Rh negative state in " antepartum period O26.899     Z67.91        PLAN:  - Reviewed use of triage nurse line and contacting the on-call provider after hours for an urgent need such as fever, vagina bleeding, bladder or vaginal infection, rupture of membranes,  or term labor.    - Reviewed best evidence for: weight gain for her weight and height for pregnancy:  Based on pre-pregnan Body mass index is 21.55 kg/m . RECOMMENDED WEIGHT GAIN: 25-35 lbs.  -If BMI>=30, weight gain/exercise handout given and reviewed. BMI entered on problem list, to include patient's preferred way to reference obesity during prenatal care, with plan for possible referrals and  testing  -If BMI >=30, and has one of other risk for sleep apnea (snoring, observed apnea or hypertension) refer for sleep study.  - Reviewed healthy diet and foods to avoid; exercise and activity during pregnancy; avoiding exposure to toxoplasmosis; and maintenance of a generally healthy lifestyle.   - Discussed the harms, benefits, side effects and alternative therapies for current prescribed and OTC medications.    - All pt's questions discussed and answered.  Pt verbalized understanding of and agreement to plan of care.     - Continue scheduled prenatal care and prn if questions or concerns      Again, thank you for allowing me to participate in the care of your patient.      Sincerely,    PATRICIO Del Angel CNM

## 2019-07-01 NOTE — PROGRESS NOTES
SUBJECTIVE:   Vanessa is a 29 year old female who presents to clinic for a new OB visit.   at 10w6d with Estimated Date of Delivery: 2020 based on US confirms. Feels well. Has started PNV.     She has not had bleeding since her LMP.   She has not had nausea. Weight loss has not occurred.   This was a planned pregnancy.   FOB is involved,      OTHER CONCERNS: none    ===========================================   ROS: 10 point ROS neg other than the symptoms noted above in the HPI.      PSYCHIATRIC:  Denies mood changes  PHQ-9 score:  No flowsheet data found.  CATRACHO-7 SCORE 2019   Total Score 0         Past History:  Her past medical history No past medical history on file..   This is her first pregnancy  Since her last LMP she denies use of alcohol, tobacco and street drugs.  HISTORY:  Family History   Problem Relation Age of Onset     Arthritis Mother         rheumatoid arthritis     Anxiety Disorder Mother      Depression Mother      Hypertension Father      Hyperlipidemia Father      Cancer Maternal Grandmother         breast cancer, 60's     Breast Cancer Maternal Grandmother      Substance Abuse Maternal Grandmother      Depression Maternal Grandmother      Cancer Paternal Grandmother         lung cancer     Osteoporosis Paternal Grandmother      Substance Abuse Paternal Grandfather      Asthma Sister         Exercise induced     Social History     Socioeconomic History     Marital status:      Spouse name: Levi     Number of children: Not on file     Years of education: Not on file     Highest education level: Not on file   Occupational History     Occupation: chiropractor     Employer: SELF   Social Needs     Financial resource strain: Not on file     Food insecurity:     Worry: Not on file     Inability: Not on file     Transportation needs:     Medical: Not on file     Non-medical: Not on file   Tobacco Use     Smoking status: Never Smoker     Smokeless tobacco: Never Used    Substance and Sexual Activity     Alcohol use: Not Currently     Comment: occasional     Drug use: No     Sexual activity: Yes     Partners: Male     Birth control/protection: None   Lifestyle     Physical activity:     Days per week: Not on file     Minutes per session: Not on file     Stress: Not on file   Relationships     Social connections:     Talks on phone: Not on file     Gets together: Not on file     Attends Faith service: Not on file     Active member of club or organization: Not on file     Attends meetings of clubs or organizations: Not on file     Relationship status: Not on file     Intimate partner violence:     Fear of current or ex partner: Not on file     Emotionally abused: Not on file     Physically abused: Not on file     Forced sexual activity: Not on file   Other Topics Concern     Not on file   Social History Narrative     Not on file     Current Outpatient Medications   Medication Sig     fish oil-omega-3 fatty acids 1000 MG capsule Take 2 g by mouth daily     IRON PO      Prenatal MV-Min-Fe Fum-FA-DHA (PRENATAL 1 PO)      No current facility-administered medications for this visit.      No Known Allergies    ============================================  MEDICAL HISTORY  No past medical history on file.  Past Surgical History:   Procedure Laterality Date     wisdom teeth         OB History    Para Term  AB Living   1 0 0 0 0 0   SAB TAB Ectopic Multiple Live Births   0 0 0 0 0      # Outcome Date GA Lbr Sridhar/2nd Weight Sex Delivery Anes PTL Lv   1 Current                  GYN History- No Abnormal Pap Smears Last pap -patient desires to delay pap until postpartum                        Cervical procedures: none                        History of STI: None    I personally reviewed the past social/family/medical and surgical history on the date of service.   I reviewed lab work done at Intake visit with patient.    EXAM:  /66 (BP Location: Left arm, Patient  "Position: Chair)   Pulse 68   Ht 1.702 m (5' 7\")   Wt 62.4 kg (137 lb 9.6 oz)   LMP 2019   BMI 21.55 kg/m     EXAM:  GENERAL:  Pleasant pregnant female, alert, cooperative  and well groomed.  SKIN:  Warm and dry, without lesions or rashes  HEAD: Symmetrical features.  MOUTH:  Buccal mucosa pink, moist without lesions.  Teeth in good repair.    NECK:  Thyroid without enlargement and nodules.  Lymph nodes not palpable.   LUNGS:  Clear to auscultation.  BREAST:    No dominant, fixed or suspicious masses are noted.  No skin or nipple changes or axillary nodes.   Nipples flat.      HEART:  RRR without murmur.  ABDOMEN: Soft without masses , tenderness or organomegaly.  No CVA tenderness.  Uterus not palpable at size equal to dates.  No scars noted.. Fetal heart tones present.  MUSCULOSKELETAL:  Full range of motion  EXTREMITIES:  No edema. No significant varicosities.   PELVIC EXAM:  Deferred at patient request  WET PREP:Not done  GC/CHLAMYDIA CULTURE OBTAINED:YES    No results found for: PAP       ASSESSMENT:  29 year old , 10w6d weeks of pregnancy with WILVER of 2020 by LMP  Intrauterine pregnancy 10w6d size consistent with dates  Genetic Screening: First Trimester Screen    ICD-10-CM    1. Normal first pregnancy in first trimester Z34.01 Chlamydia trachomatis PCR (Clinic Collect)     Neisseria gonorrhoeae PCR   2. Normal first pregnancy confirmed, antepartum Z34.00    3. Normal first pregnancy confirmed, currently in first trimester Z34.01    4. Genetic screening Z13.79 [facility] REFERRAL-PREGNANCY   5. Encounter for fetal anatomic survey Z36.89 [facility] REFERRAL-PREGNANCY   6. Rh negative state in antepartum period O26.899     Z67.91        PLAN:  - Reviewed use of triage nurse line and contacting the on-call provider after hours for an urgent need such as fever, vagina bleeding, bladder or vaginal infection, rupture of membranes,  or term labor.    - Reviewed best evidence " for: weight gain for her weight and height for pregnancy:  Based on pre-pregnan Body mass index is 21.55 kg/m . RECOMMENDED WEIGHT GAIN: 25-35 lbs.  -If BMI>=30, weight gain/exercise handout given and reviewed. BMI entered on problem list, to include patient's preferred way to reference obesity during prenatal care, with plan for possible referrals and  testing  -If BMI >=30, and has one of other risk for sleep apnea (snoring, observed apnea or hypertension) refer for sleep study.  - Reviewed healthy diet and foods to avoid; exercise and activity during pregnancy; avoiding exposure to toxoplasmosis; and maintenance of a generally healthy lifestyle.   - Discussed the harms, benefits, side effects and alternative therapies for current prescribed and OTC medications.    - All pt's questions discussed and answered.  Pt verbalized understanding of and agreement to plan of care.     - Continue scheduled prenatal care and prn if questions or concerns    PATRICIO Del Angel CNM

## 2019-07-02 ENCOUNTER — TRANSCRIBE ORDERS (OUTPATIENT)
Dept: MATERNAL FETAL MEDICINE | Facility: CLINIC | Age: 29
End: 2019-07-02

## 2019-07-02 DIAGNOSIS — O26.90 PREGNANCY RELATED CONDITION, ANTEPARTUM: Primary | ICD-10-CM

## 2019-07-02 LAB
C TRACH DNA SPEC QL NAA+PROBE: NEGATIVE
N GONORRHOEA DNA SPEC QL NAA+PROBE: NEGATIVE
SPECIMEN SOURCE: NORMAL
SPECIMEN SOURCE: NORMAL

## 2019-07-02 ASSESSMENT — ANXIETY QUESTIONNAIRES: GAD7 TOTAL SCORE: 0

## 2019-07-11 ENCOUNTER — PRE VISIT (OUTPATIENT)
Dept: MATERNAL FETAL MEDICINE | Facility: CLINIC | Age: 29
End: 2019-07-11

## 2019-07-15 ENCOUNTER — OFFICE VISIT (OUTPATIENT)
Dept: MATERNAL FETAL MEDICINE | Facility: CLINIC | Age: 29
End: 2019-07-15
Attending: ADVANCED PRACTICE MIDWIFE
Payer: COMMERCIAL

## 2019-07-15 ENCOUNTER — HOSPITAL ENCOUNTER (OUTPATIENT)
Dept: ULTRASOUND IMAGING | Facility: CLINIC | Age: 29
Discharge: HOME | End: 2019-07-15
Attending: ADVANCED PRACTICE MIDWIFE | Admitting: ADVANCED PRACTICE MIDWIFE
Payer: COMMERCIAL

## 2019-07-15 DIAGNOSIS — Z36.9 UNSPECIFIED ANTENATAL SCREENING: Primary | ICD-10-CM

## 2019-07-15 DIAGNOSIS — O26.90 PREGNANCY RELATED CONDITION, ANTEPARTUM: ICD-10-CM

## 2019-07-15 DIAGNOSIS — Z36.82 ENCOUNTER FOR NUCHAL TRANSLUCENCY TESTING: Primary | ICD-10-CM

## 2019-07-15 DIAGNOSIS — Z31.430 ENCOUNTER OF FEMALE FOR TESTING FOR GENETIC DISEASE CARRIER STATUS FOR PROCREATIVE MANAGEMENT: ICD-10-CM

## 2019-07-15 PROCEDURE — 40000072 ZZH STATISTIC GENETIC COUNSELING, < 16 MIN: Mod: ZF | Performed by: GENETIC COUNSELOR, MS

## 2019-07-15 PROCEDURE — 76813 OB US NUCHAL MEAS 1 GEST: CPT

## 2019-07-15 PROCEDURE — 36415 COLL VENOUS BLD VENIPUNCTURE: CPT | Performed by: OBSTETRICS & GYNECOLOGY

## 2019-07-15 PROCEDURE — 40000954 ZZHCL STATISTIC COUNSYL FAMILY PREP: Performed by: OBSTETRICS & GYNECOLOGY

## 2019-07-15 PROCEDURE — 40000791 ZZHCL STATISTIC VERIFI PRENATAL TRISOMY 21,18,13: Performed by: OBSTETRICS & GYNECOLOGY

## 2019-07-15 NOTE — PROGRESS NOTES
[facility]  Genetic Counseling Consult    Patient: Vanessa Stephenson YOB: 1990   Date of Service: 7/15/19      Vanessa Stephenson was seen at [facility] for genetic consultation to discuss the options for routine screening for fetal chromosome abnormalities. Vanessa was accompanied by her spouse, Levi to today's visit.       Impression/Plan:   1.  Vanessa had an ultrasound and blood draw for NIPT (Innatal test through [facility]).  Results are expected within 7-10 days, and will be available in [facility].  We will contact her to discuss the results, and a copy will be forwarded to the office of the referring OB provider. Vanessa provided verbal permission for results to be left on her voicemail at [phone]. She requested the results do not include the sex.     2. Vanessa and Levi opted to pursue expanded carrier screening, which was drawn today and sent to [facility]. Results are expected within 10-14 days. We will contact her to discuss the results, and a copy will be forwarded to the office of the referring OB provider. Vanessa asked that we call her with results at [phone], but that we do not leave them in her voicemail. Authorization to discuss health information was signed today for us to discuss results with Vanessa's spouse, Levi.     3. Maternal serum AFP (single marker screen) is recommended after 15 weeks to screen for open neural tube defects. A quad screen should not be performed.     Pregnancy History:   /Parity:    Age at Delivery: 29 year old  WILVER: 2020, by Ultrasound  Gestational Age: 12w6d    No significant complications or exposures were reported in the current pregnancy.    This is Vanessa's first pregnancy.     Medical History:   Vanessa s reported medical history is not expected to impact pregnancy management or risks to fetal development.       Family History:   A three-generation pedigree was obtained,  and is scanned under the  Media  tab.   The following significant findings were reported by Vanessa:    Vanessa's spouse, Levi is 36 and healthy.     It was reported that Vanessa's sister was found to have supraventricular tachycardia (SVT) in childhood that has now resolved. SVT is the most common rhythm disturbance in children, with an estimated prevalence of 0.1 to 0.4 percent in the general pediatric population. The majority of patients presenting with SVT have structurally normal hearts. Without further details regarding the form of SVT in Vanessa's sister, recurrence risk is challenging, however given this is a second degree relative to the pregnancy, it may be increased.     Otherwise, the reported family history is negative for multiple miscarriages, stillbirths, birth defects, intellectual disability, known genetic conditions, and consanguinity.       Carrier Screening:   The patient reports that she and the father of the pregnancy have  ancestry:   Cystic fibrosis is an autosomal recessive genetic condition that occurs with increased frequency in individuals of  ancestry and carrier screening for this condition is available.  In addition,  screening in the [facility] includes cystic fibrosis.       Expanded carrier screening for mutations in a large panel of genes associated with autosomal recessive conditions including cystic fibrosis, spinal muscular atrophy, and others, is now available.      The patient and her partner, Levi elected to pursue carrier screening today.  Her blood was drawn for [employer] expanded carrier screen panel (ID:[id number]) and sent to [facility].  We will contact her when these test results become available, and a copy of these results will be relayed to her primary OB provider.       Risk Assessment for Chromosome Conditions:   We explained that the risk for fetal chromosome abnormalities increases with maternal age. We discussed specific features  of common chromosome abnormalities, including Down syndrome, trisomy 13, trisomy 18, and sex chromosome trisomies.      - At age 29 at midtrimester, the risk to have a baby with Down syndrome is 1 in 690.     - At age 29 at midtrimester, the risk to have a baby with any chromosome abnormality is 1 in 345.        Testing Options:   We discussed the following options:   First trimester screening    First trimester ultrasound with nuchal translucency and nasal bone assessments, maternal plasma hCG, YONG-A, and AFP measurement    Screens for fetal trisomy 21, trisomy 13, and trisomy 18    Cannot screen for open neural tube defects; maternal serum AFP after 15 weeks is recommended     Non-invasive Prenatal Testing (NIPT)    Maternal plasma cell-free DNA testing; first trimester ultrasound with nuchal translucency and nasal bone assessment is recommended, when appropriate    Screens for fetal trisomy 21, trisomy 13, trisomy 18, and sex chromosome aneuploidy    Cannot screen for open neural tube defects; maternal serum AFP after 15 weeks is recommended     Chorionic villus sampling (CVS)    Invasive procedure typically performed in the first trimester by which placental villi are obtained for the purpose of chromosome analysis and/or other prenatal genetic analysis    Diagnostic results; >99% sensitivity for fetal chromosome abnormalities    Cannot test for open neural tube defects; maternal serum AFP after 15 weeks is recommended     Genetic Amniocentesis    Invasive procedure typically performed in the second trimester by which amniotic fluid is obtained for the purpose of chromosome analysis and/or other prenatal genetic analysis    Diagnostic results; >99% sensitivity for fetal chromosome abnormalities    AFAFP measurement tests for open neural tube defects     Comprehensive (Level II) ultrasound: Detailed ultrasound performed between 18-22 weeks gestation to screen for major birth defects and markers for  aneuploidy.      We reviewed the benefits and limitations of this testing.  Screening tests provide a risk assessment specific to the pregnancy for certain fetal chromosome abnormalities, but cannot definitively diagnose or exclude a fetal chromosome abnormality.  Follow-up genetic counseling and consideration of diagnostic testing is recommended with any abnormal screening result.      It was a pleasure to be involved with Vanessa s care. Face-to-face time of the meeting was 50 minutes.    Neetu Quinones MS,   Maternal Fetal Medicine  [facility]  Ph: [phone]  [email or web address]    Patient seen, evaluated and discussed with the Genetic Counseling Intern. I have verified the content of the note, which accurately reflects my assessment of the patient and the plan of care.   Supervising Genetic Counselor   Feliberto Lay MS, [facility]   Maternal Fetal Medicine  [facility]   Phone: [phone]   Email: [email or web address]

## 2019-07-15 NOTE — PROGRESS NOTES
Please see ultrasound report under imaging tab for details on ultrasound performed today.    oDrita Cantu MD  , OB/GYN  Maternal-Fetal Medicine  [email or web address]  [phone] (Academic office)  [phone] (Pager)

## 2019-07-19 ENCOUNTER — TELEPHONE (OUTPATIENT)
Dept: MATERNAL FETAL MEDICINE | Facility: CLINIC | Age: 29
End: 2019-07-19

## 2019-07-19 NOTE — TELEPHONE ENCOUNTER
7/19/2019       Called Vanessa to discuss NIPT results.  Results came back negative for chromosome abnormalities in chromosomes 21, 18, & 13, as well as the sex chromosomes.  These test results do not definitively rule out the possibility of one of these conditions, but they do greatly reduce the likelihood.  Vanessa declined to learn fetal sex indicated by testing at this time and this information is available on the test report. This information was left in Vanessa's voicemail as requested at her genetic counseling appointment, and Vanessa was encouraged to reach out if she has any questions or concerns in the future.       Feliberto Lay MS, [facility]  Licensed Genetic Counselor  Phone: [phone]  Pager: [phone]

## 2019-07-23 ENCOUNTER — TELEPHONE (OUTPATIENT)
Dept: MATERNAL FETAL MEDICINE | Facility: CLINIC | Age: 29
End: 2019-07-23

## 2019-07-23 LAB — LAB SCANNED RESULT: NORMAL

## 2019-07-23 NOTE — TELEPHONE ENCOUNTER
July 23, 2019    I spoke with Vanessa regarding her and her , Levi's [employer] carrier screening results. Vanessa was not found to be a carrier for any of the 176 conditions on the panel. We reviewed Levi's carrier screen results as well, described below. Consent for authorization to share health information was signed for both Vanessa and Levi at their genetic counseling visit.     Most of the conditions on the carrier screening panel are inherited in an autosomal recessive fashion. Every individual has two copies of the gene that is responsible for this condition, and if someone has a change or mutation that impacts how one copy of the gene functions, they are called a carrier for the condition.  If someone has two copies of the gene that have a harmful change, they are affected with the condition.  If two people who are carriers for the same condition have children, there is a chance for their children to be affected.  Each parent has a 50% chance for passing on their copy of the gene with a mutation, so there is a 25% chance for each pregnancy to get two copies of the mutation and be affected, a 50% chance for each pregnancy to be an unaffected carrier, and a 25% chance to be unaffected with two normal copies of the gene.    For each condition, [employer] provides a reproductive risk. This risk is based on Vanessa's carrier status, Levi's carrier status, and the 1 in 4 (25%) chance of both parents passing on the allele with the variant.     Vanessa was not found to be a carrier for any of the conditions on the panel.     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 Individuals with this condition typically do not have other symptoms or systems of the body involved with the disease.       2) Pompe Disease       Patient carrier status: NEGATIVE     Partner carrier status: POSITIVE (c.2238G>C) heterozygote     Reproductive Risk: 1 in 16,000    Pompe disease is a glycogen storage disorder where the body fails to produce enough of an enzyme needed to break down glycogen. As a result, glycogen builds up in the body and damages cells, particularly in the muscles.        3) Familial Dysautonomia        Patient carrier status: NEGATIVE     Partner carrier status: POSITIVE (c.2204+6T>C) heterozygote      Reproductive Risk: 1 in 200,000    Familial dysautonomia is a condition that causes nerve cells to deteriorate. It affects the autonomic and sensory nervous system and symptoms typically first appear in infancy.       4) Nephrotic Syndrome, NPHS2-related        Patient carrier status: NEGATIVE     Partner carrier status: POSITIVE (c.686G>A) heterozygote      Reproductive Risk: < 1 in 1,000,000    Nephrotic syndrome is a condition that causes issues with kidney function often leading to kidney failure. Symptoms typically begin between 4 and 12 months of age.      Vanessa and Levi wished for the results to be released to them from Between. Vanessa and Levi provided verbal permission for communication via e-mail. We discussed that e-mail is not a secure mode of communication. Vanessa and Levi felt comfortable with receiving these results through e-mail, results were released.     All of Vanessa s questions were answered to her satisfaction and I encouraged her to contact me if she has any questions in the future.    Neetu Quinones MS, GC  Maternal Fetal Medicine  [facility]  Ph: [phone]  [email or web address]

## 2019-07-24 PROBLEM — Z00.6 RESEARCH STUDY PATIENT: Status: ACTIVE | Noted: 2019-07-24

## 2019-07-29 LAB — LAB SCANNED RESULT: NORMAL

## 2019-08-19 ENCOUNTER — TELEPHONE (OUTPATIENT)
Dept: OBGYN | Facility: CLINIC | Age: 29
End: 2019-08-19

## 2019-08-19 NOTE — TELEPHONE ENCOUNTER
Received a call from [facility] that Vanessa cancelled her Level 2 US with them. As I was reviewing her chart, noticed she cancelled her last OB appt in clinic and hasn't been seen since [facility] appt 7/1/19.   Attempted to reach Vanessa via TC, left vm for her to call triage to let us know if switched clinics and if not, stressed the importance of getting regular prenatal care. Triage number given to call back .

## 2019-08-26 ENCOUNTER — TELEPHONE (OUTPATIENT)
Dept: MATERNAL FETAL MEDICINE | Facility: CLINIC | Age: 29
End: 2019-08-26

## 2019-08-26 NOTE — TELEPHONE ENCOUNTER
Writer called to schedule patient a L2 appointment. Patient declined scheduling and stated that she KALLIE to [facility].     Referring clinic notified. Removing order.    Milind Isbell,    , [facility]

## 2020-03-11 ENCOUNTER — HEALTH MAINTENANCE LETTER (OUTPATIENT)
Age: 30
End: 2020-03-11

## 2020-12-27 ENCOUNTER — HEALTH MAINTENANCE LETTER (OUTPATIENT)
Age: 30
End: 2020-12-27

## 2021-04-24 ENCOUNTER — HEALTH MAINTENANCE LETTER (OUTPATIENT)
Age: 31
End: 2021-04-24

## 2021-10-09 ENCOUNTER — HEALTH MAINTENANCE LETTER (OUTPATIENT)
Age: 31
End: 2021-10-09

## 2022-05-21 ENCOUNTER — HEALTH MAINTENANCE LETTER (OUTPATIENT)
Age: 32
End: 2022-05-21

## 2022-07-18 ENCOUNTER — TELEPHONE (OUTPATIENT)
Dept: OBGYN | Facility: CLINIC | Age: 32
End: 2022-07-18

## 2022-07-18 NOTE — TELEPHONE ENCOUNTER
[facility] Call Center    Phone Message    May a detailed message be left on voicemail: yes     Reason for Call: Patient has an IUD that needs to be removed that is misplaced/possibly embedded. Found during an ultrasound at [facility]. Writer sending TE to let care team know of scheduled appt and any adjustments that might be needed. Please reach out to patient if there are changes. Thank you    Action Taken: Message routed to:  [facility]    Travel Screening: Not Applicable

## 2022-07-19 NOTE — TELEPHONE ENCOUNTER
Called patient to discuss IUD removal , no answer left voicemail. No records received from Radiology/Imaging.   Discussed patient she needs to send records if possible, number given to fax.

## 2022-09-17 ENCOUNTER — HEALTH MAINTENANCE LETTER (OUTPATIENT)
Age: 32
End: 2022-09-17

## 2023-03-15 ENCOUNTER — TRANSFERRED RECORDS (OUTPATIENT)
Dept: HEALTH INFORMATION MANAGEMENT | Facility: CLINIC | Age: 33
End: 2023-03-15
Payer: COMMERCIAL

## 2023-03-15 ENCOUNTER — MEDICAL CORRESPONDENCE (OUTPATIENT)
Dept: HEALTH INFORMATION MANAGEMENT | Facility: CLINIC | Age: 33
End: 2023-03-15
Payer: COMMERCIAL

## 2023-03-16 ENCOUNTER — TRANSCRIBE ORDERS (OUTPATIENT)
Dept: MATERNAL FETAL MEDICINE | Facility: CLINIC | Age: 33
End: 2023-03-16
Payer: COMMERCIAL

## 2023-03-16 DIAGNOSIS — O26.90 PREGNANCY RELATED CONDITION, ANTEPARTUM: Primary | ICD-10-CM

## 2023-04-07 ENCOUNTER — PRE VISIT (OUTPATIENT)
Dept: MATERNAL FETAL MEDICINE | Facility: CLINIC | Age: 33
End: 2023-04-07
Payer: COMMERCIAL

## 2023-04-11 ENCOUNTER — HOSPITAL ENCOUNTER (OUTPATIENT)
Dept: ULTRASOUND IMAGING | Facility: CLINIC | Age: 33
Discharge: HOME | End: 2023-04-11
Attending: ADVANCED PRACTICE MIDWIFE
Payer: COMMERCIAL

## 2023-04-11 ENCOUNTER — OFFICE VISIT (OUTPATIENT)
Dept: MATERNAL FETAL MEDICINE | Facility: CLINIC | Age: 33
End: 2023-04-11
Attending: ADVANCED PRACTICE MIDWIFE
Payer: COMMERCIAL

## 2023-04-11 DIAGNOSIS — O09.212 PRIOR PRETERM LABOR, ANTEPARTUM, SECOND TRIMESTER: Primary | ICD-10-CM

## 2023-04-11 DIAGNOSIS — O26.90 PREGNANCY RELATED CONDITION, ANTEPARTUM: ICD-10-CM

## 2023-04-11 PROCEDURE — 76817 TRANSVAGINAL US OBSTETRIC: CPT | Mod: 26 | Performed by: OBSTETRICS & GYNECOLOGY

## 2023-04-11 PROCEDURE — 76817 TRANSVAGINAL US OBSTETRIC: CPT

## 2023-04-11 PROCEDURE — 76811 OB US DETAILED SNGL FETUS: CPT | Mod: 26 | Performed by: OBSTETRICS & GYNECOLOGY

## 2023-04-11 PROCEDURE — 76811 OB US DETAILED SNGL FETUS: CPT

## 2023-04-11 NOTE — PROGRESS NOTES
Please see full imaging report from [facility] program under imaging tab.    Lisandro Rollins MD  Maternal Fetal Medicine

## 2023-06-04 ENCOUNTER — HEALTH MAINTENANCE LETTER (OUTPATIENT)
Age: 33
End: 2023-06-04

## 2023-06-13 ENCOUNTER — LAB REQUISITION (OUTPATIENT)
Dept: LAB | Facility: CLINIC | Age: 33
End: 2023-06-13
Payer: COMMERCIAL

## 2023-06-13 DIAGNOSIS — Z34.83 ENCOUNTER FOR SUPERVISION OF OTHER NORMAL PREGNANCY, THIRD TRIMESTER: ICD-10-CM

## 2023-06-13 LAB
BASOPHILS # BLD AUTO: 0 10E3/UL (ref 0–0.2)
BASOPHILS NFR BLD AUTO: 0 %
EOSINOPHIL # BLD AUTO: 0.1 10E3/UL (ref 0–0.7)
EOSINOPHIL NFR BLD AUTO: 1 %
ERYTHROCYTE [DISTWIDTH] IN BLOOD BY AUTOMATED COUNT: 13 % (ref 10–15)
HCT VFR BLD AUTO: 38.8 % (ref 35–47)
HGB BLD-MCNC: 12.7 G/DL (ref 11.7–15.7)
IMM GRANULOCYTES # BLD: 0.1 10E3/UL
IMM GRANULOCYTES NFR BLD: 1 %
LYMPHOCYTES # BLD AUTO: 1.5 10E3/UL (ref 0.8–5.3)
LYMPHOCYTES NFR BLD AUTO: 15 %
MCH RBC QN AUTO: 31.4 PG (ref 26.5–33)
MCHC RBC AUTO-ENTMCNC: 32.7 G/DL (ref 31.5–36.5)
MCV RBC AUTO: 96 FL (ref 78–100)
MONOCYTES # BLD AUTO: 0.6 10E3/UL (ref 0–1.3)
MONOCYTES NFR BLD AUTO: 6 %
NEUTROPHILS # BLD AUTO: 7.5 10E3/UL (ref 1.6–8.3)
NEUTROPHILS NFR BLD AUTO: 77 %
NRBC # BLD AUTO: 0 10E3/UL
NRBC BLD AUTO-RTO: 0 /100
PLATELET # BLD AUTO: 202 10E3/UL (ref 150–450)
RBC # BLD AUTO: 4.04 10E6/UL (ref 3.8–5.2)
WBC # BLD AUTO: 9.7 10E3/UL (ref 4–11)

## 2023-06-13 PROCEDURE — 85025 COMPLETE CBC W/AUTO DIFF WBC: CPT | Mod: ORL | Performed by: MIDWIFE, LAY

## 2023-06-13 PROCEDURE — 82950 GLUCOSE TEST: CPT | Mod: ORL | Performed by: MIDWIFE, LAY

## 2023-06-13 PROCEDURE — 82728 ASSAY OF FERRITIN: CPT | Mod: ORL | Performed by: MIDWIFE, LAY

## 2023-06-13 PROCEDURE — 86850 RBC ANTIBODY SCREEN: CPT | Mod: ORL | Performed by: MIDWIFE, LAY

## 2023-06-14 LAB
ANTIBODY SCREEN: NEGATIVE
FERRITIN SERPL-MCNC: 53 NG/ML (ref 6–175)
GLUCOSE 1H P 50 G GLC PO SERPL-MCNC: 104 MG/DL (ref 70–129)
SPECIMEN EXPIRATION DATE: NORMAL

## 2023-08-09 ENCOUNTER — LAB REQUISITION (OUTPATIENT)
Dept: LAB | Facility: CLINIC | Age: 33
End: 2023-08-09
Payer: COMMERCIAL

## 2023-08-09 DIAGNOSIS — Z3A.36 36 WEEKS GESTATION OF PREGNANCY: ICD-10-CM

## 2023-08-09 DIAGNOSIS — Z34.83 ENCOUNTER FOR SUPERVISION OF OTHER NORMAL PREGNANCY, THIRD TRIMESTER: ICD-10-CM

## 2023-08-09 PROCEDURE — 87653 STREP B DNA AMP PROBE: CPT | Mod: ORL | Performed by: MIDWIFE, LAY

## 2023-08-10 LAB — GP B STREP DNA SPEC QL NAA+PROBE: NEGATIVE

## 2024-07-13 ENCOUNTER — HEALTH MAINTENANCE LETTER (OUTPATIENT)
Age: 34
End: 2024-07-13

## 2025-07-19 ENCOUNTER — HEALTH MAINTENANCE LETTER (OUTPATIENT)
Age: 35
End: 2025-07-19